# Patient Record
Sex: MALE | Race: WHITE | NOT HISPANIC OR LATINO
[De-identification: names, ages, dates, MRNs, and addresses within clinical notes are randomized per-mention and may not be internally consistent; named-entity substitution may affect disease eponyms.]

---

## 2022-09-01 ENCOUNTER — NON-APPOINTMENT (OUTPATIENT)
Age: 52
End: 2022-09-01

## 2023-06-01 ENCOUNTER — NON-APPOINTMENT (OUTPATIENT)
Age: 53
End: 2023-06-01

## 2023-06-07 ENCOUNTER — APPOINTMENT (OUTPATIENT)
Dept: GASTROENTEROLOGY | Facility: CLINIC | Age: 53
End: 2023-06-07
Payer: COMMERCIAL

## 2023-06-07 ENCOUNTER — NON-APPOINTMENT (OUTPATIENT)
Age: 53
End: 2023-06-07

## 2023-06-07 VITALS
HEIGHT: 73 IN | OXYGEN SATURATION: 98 % | RESPIRATION RATE: 19 BRPM | BODY MASS INDEX: 21.34 KG/M2 | SYSTOLIC BLOOD PRESSURE: 138 MMHG | DIASTOLIC BLOOD PRESSURE: 76 MMHG | HEART RATE: 54 BPM | WEIGHT: 161 LBS

## 2023-06-07 DIAGNOSIS — K31.84 GASTROPARESIS: ICD-10-CM

## 2023-06-07 PROCEDURE — 99204 OFFICE O/P NEW MOD 45 MIN: CPT

## 2023-06-07 NOTE — PHYSICAL EXAM
[Alert] : alert [Normal Voice/Communication] : normal voice/communication [Healthy Appearing] : healthy appearing [No Acute Distress] : no acute distress [Sclera] : the sclera and conjunctiva were normal [Hearing Threshold Finger Rub Not [place]] : hearing was normal [Normal Lips/Gums] : the lips and gums were normal [Oropharynx] : the oropharynx was normal [Normal Appearance] : the appearance of the neck was normal [No Neck Mass] : no neck mass was observed [No Respiratory Distress] : no respiratory distress [Normal PMI] : the apical impulse was abnormal [Bowel Sounds] : normal bowel sounds [Abdomen Tenderness] : non-tender [No Masses] : no abdominal mass palpated [Abdomen Soft] : soft [] : no hepatosplenomegaly [Oriented To Time, Place, And Person] : oriented to person, place, and time [Abnormal Walk] : normal gait [Normal Color / Pigmentation] : normal skin color and pigmentation [de-identified] : deferred  to colonoscopy

## 2023-06-07 NOTE — HISTORY OF PRESENT ILLNESS
[FreeTextEntry1] : 53 year M fam hx crc, chronic chest/RUQ pain, EoE, h/o chronic constipation, hemorrhoids, presents for evaluation of EoE and CRC screening\par He is seen at the request of Dr. Sepideh Bruce\par \par pat gf death from CRC at age 41\par started colonoscopy early\par \par Colonoscopy Dr. Husain 5/23/11- suboptimal prep, int hemorrhoids\par Colonoscopy. Dr. Husain 06/2011- fair prep, otherwise normal.\par Colonoscopy Dr. Thomas 10/2013 adequate prep, int hemorrhoids\par Colonoscopy Dr. Thomas 06/2017 fair- adequate prep, int hemorrhoids\par EGD Dr. Husain 10/2015- HH, irreg z line\par EGD Dr. Husain 06/2017- inc eos up to 91 per hpf, reactive gastropathy, normal duodenum\par \par long h/o chronic chest/RUQ  pain  for years\par \par EGD 06/2017- EoE\par saw allergist and changed his diet, cp improved. did not have EGD since them. \par \par stopped fried foods, no dairy, no gluten, no pasta sauce, no peanut, no shellfish\par he has h/o multiple allergies, allergy shots in his 20s\par \par chronic constipation he thinks it could be related to his cp. still gets cp periodically, ppt by large meals/ bending over. \par had rectal bleeding last year- \par scheduled for EGD/Colonoscopy- last year did not schedule. \par \par was on nexium a while ago\par prefers no medication\par \par takes psyllium daily- for past 9 months -now bm every 2 days- no rectal bleeding since last year\par has taken colace in the past\par no miralax or other constipation meds tried. declines regular MiraLAX\par \par ct many years ago- saw oncologist - ?lymphocytosis- normal. f/u ct a few years ago- reportedly unremarkable\par \par 180 lbs 10 years ago\par 160 lbs now, 165 lbs 1 year ago\par \par exercises regularly, 2-4 x per week- biking\par good appetite \par \par occ motrin \par \par 11/2022- reports labs normal. \par \par difficulty with colon prep- 5 day prep. \par \par meds: \par vitamins \par fish oil\par gelusil or Mylatna PRN\par Psyllium\par \par prior cardiac testing unremarkable. \par \par Patient denies n/v/heartburn, reflux, dysphagia/odynophagia, change in bowel habits, diarrhea,  melena. no weight loss.  Good appetite and energy level. Patient has daily BM, denies regular NSAID use. \par \par fam hx - pt gf -crc\par sister -colon polyps\par \par PMD: Dr. Sepideh Calvin

## 2023-06-07 NOTE — ASSESSMENT
[FreeTextEntry1] : CRC screening- \par due for colonoscopy \par h/o inadequate prep\par MiraLAX BID x 5 days, 2 dulcolax 2 days prior to procedure, 2 Dulcolax day prior to procedure, GoLYTELY, MiraLAX x 2 glasses am of procedure\par \par EoE- treated with elimination diet\par -EGD with bx to assess response\par \par \par RUQ/Chest pain, chronic, overall improved compared to prior. He reports prior negative cardiac eval. \par -check abd us, r/o biliary colic\par

## 2023-07-13 ENCOUNTER — RESULT REVIEW (OUTPATIENT)
Age: 53
End: 2023-07-13

## 2023-07-14 ENCOUNTER — APPOINTMENT (OUTPATIENT)
Dept: GASTROENTEROLOGY | Facility: HOSPITAL | Age: 53
End: 2023-07-14

## 2023-07-18 ENCOUNTER — NON-APPOINTMENT (OUTPATIENT)
Age: 53
End: 2023-07-18

## 2023-07-19 ENCOUNTER — OFFICE (OUTPATIENT)
Dept: URBAN - METROPOLITAN AREA CLINIC 29 | Facility: CLINIC | Age: 53
Setting detail: OPHTHALMOLOGY
End: 2023-07-19
Payer: COMMERCIAL

## 2023-07-19 ENCOUNTER — RX ONLY (RX ONLY)
Age: 53
End: 2023-07-19

## 2023-07-19 DIAGNOSIS — H53.002: ICD-10-CM

## 2023-07-19 DIAGNOSIS — H52.03: ICD-10-CM

## 2023-07-19 DIAGNOSIS — H52.4: ICD-10-CM

## 2023-07-19 DIAGNOSIS — H50.51: ICD-10-CM

## 2023-07-19 PROCEDURE — 92014 COMPRE OPH EXAM EST PT 1/>: CPT | Performed by: OPHTHALMOLOGY

## 2023-07-19 PROCEDURE — 92015 DETERMINE REFRACTIVE STATE: CPT | Performed by: OPHTHALMOLOGY

## 2023-07-19 ASSESSMENT — REFRACTION_CURRENTRX
OD_SPHERE: +1.00
OS_CYLINDER: +1.00
OD_AXIS: 090
OD_SPHERE: +1.50
OS_SPHERE: +0.75
OD_ADD: +2.00
OS_SPHERE: +1.00
OS_OVR_VA: 20/
OD_ADD: +1.75
OD_OVR_VA: 20/
OS_VPRISM_DIRECTION: PROGS
OD_OVR_VA: 20/
OS_CYLINDER: 0.00
OS_CYLINDER: SPHERE
OD_CYLINDER: 0.00
OS_OVR_VA: 20/
OD_VPRISM_DIRECTION: PROGS
OD_AXIS: 60
OD_SPHERE: +1.75
OD_CYLINDER: 0.00
OD_SPHERE: +1.00
OS_AXIS: 169
OS_SPHERE: +1.50
OD_VPRISM_DIRECTION: PROGS
OS_CYLINDER: SPHERE
OD_OVR_VA: 20/
OD_AXIS: 090
OS_SPHERE: +2.50
OS_OVR_VA: 20/
OD_AXIS: 70
OD_CYLINDER: +0.25
OS_ADD: +1.75
OS_ADD: +2.00
OS_AXIS: 090
OS_VPRISM_DIRECTION: PROGS
OD_CYLINDER: SPHERE

## 2023-07-19 ASSESSMENT — OVER_REFRACTION
OS_SPHERE: -0.75
OS_AXIS: 180
OD_VA1: 20/20-
OS_CYLINDER: +0.50
OS_ADD: +1.25
OD_ADD: +1.25
OD_SPHERE: +0.50
OD_CYLINDER: SPHERE

## 2023-07-19 ASSESSMENT — REFRACTION_AUTOREFRACTION
OD_AXIS: 070
OD_SPHERE: +0.50
OS_CYLINDER: +0.50
OD_CYLINDER: +0.50
OS_AXIS: 162
OS_SPHERE: -0.25

## 2023-07-19 ASSESSMENT — REFRACTION_MANIFEST
OD_CYLINDER: SPHERE
OD_VA1: 20/30
OD_SPHERE: +1.75
OS_VA1: 20/40
OS_SPHERE: +3.00
OD_ADD: +2.00
OS_ADD: +2.00
OS_SPHERE: +5.00
OS_CYLINDER: SPHERE
OS_AXIS: S
OS_CYLINDER: SPHERE
OD_SPHERE: +2.00
OD_SPHERE: +2.00
OS_VA1: 20/40
OD_CYLINDER: SPHERE
OU_VA: 20/30
OD_VA1: 20/25
OD_SPHERE: +3.00
OS_SPHERE: +3.00
OS_SPHERE: +2.50
OS_SPHERE: +4.25
OS_VA1: 20/30

## 2023-07-19 ASSESSMENT — SPHEQUIV_DERIVED
OS_SPHEQUIV: 0
OD_SPHEQUIV: 0.75

## 2023-07-19 ASSESSMENT — VISUAL ACUITY
OS_BCVA: 20/20
OD_BCVA: 20/70

## 2023-07-19 ASSESSMENT — CONFRONTATIONAL VISUAL FIELD TEST (CVF)
OD_FINDINGS: FULL
OS_FINDINGS: FULL

## 2023-07-19 ASSESSMENT — TONOMETRY
OS_IOP_MMHG: 15
OD_IOP_MMHG: 15

## 2023-07-25 ENCOUNTER — RESULT REVIEW (OUTPATIENT)
Age: 53
End: 2023-07-25

## 2023-09-20 ENCOUNTER — RESULT REVIEW (OUTPATIENT)
Age: 53
End: 2023-09-20

## 2023-10-25 ENCOUNTER — APPOINTMENT (OUTPATIENT)
Dept: GASTROENTEROLOGY | Facility: CLINIC | Age: 53
End: 2023-10-25
Payer: COMMERCIAL

## 2023-10-25 VITALS
OXYGEN SATURATION: 99 % | WEIGHT: 165 LBS | BODY MASS INDEX: 21.87 KG/M2 | SYSTOLIC BLOOD PRESSURE: 114 MMHG | HEIGHT: 73 IN | DIASTOLIC BLOOD PRESSURE: 70 MMHG | HEART RATE: 50 BPM

## 2023-10-25 DIAGNOSIS — K59.09 OTHER CONSTIPATION: ICD-10-CM

## 2023-10-25 DIAGNOSIS — K76.9 LIVER DISEASE, UNSPECIFIED: ICD-10-CM

## 2023-10-25 DIAGNOSIS — G89.29 RIGHT UPPER QUADRANT PAIN: ICD-10-CM

## 2023-10-25 DIAGNOSIS — Z12.11 ENCOUNTER FOR SCREENING FOR MALIGNANT NEOPLASM OF COLON: ICD-10-CM

## 2023-10-25 DIAGNOSIS — R10.11 RIGHT UPPER QUADRANT PAIN: ICD-10-CM

## 2023-10-25 PROCEDURE — 99214 OFFICE O/P EST MOD 30 MIN: CPT

## 2023-12-20 ENCOUNTER — APPOINTMENT (OUTPATIENT)
Age: 53
End: 2023-12-20
Payer: COMMERCIAL

## 2023-12-20 ENCOUNTER — NON-APPOINTMENT (OUTPATIENT)
Age: 53
End: 2023-12-20

## 2023-12-20 VITALS
HEIGHT: 73 IN | WEIGHT: 172 LBS | BODY MASS INDEX: 22.8 KG/M2 | SYSTOLIC BLOOD PRESSURE: 110 MMHG | DIASTOLIC BLOOD PRESSURE: 70 MMHG | OXYGEN SATURATION: 100 % | HEART RATE: 54 BPM

## 2023-12-20 DIAGNOSIS — E55.9 VITAMIN D DEFICIENCY, UNSPECIFIED: ICD-10-CM

## 2023-12-20 DIAGNOSIS — D18.03 HEMANGIOMA OF INTRA-ABDOMINAL STRUCTURES: ICD-10-CM

## 2023-12-20 DIAGNOSIS — Z12.5 ENCOUNTER FOR SCREENING FOR MALIGNANT NEOPLASM OF PROSTATE: ICD-10-CM

## 2023-12-20 DIAGNOSIS — Z00.00 ENCOUNTER FOR GENERAL ADULT MEDICAL EXAMINATION W/OUT ABNORMAL FINDINGS: ICD-10-CM

## 2023-12-20 DIAGNOSIS — K20.0 EOSINOPHILIC ESOPHAGITIS: ICD-10-CM

## 2023-12-20 DIAGNOSIS — Z11.59 ENCOUNTER FOR SCREENING FOR OTHER VIRAL DISEASES: ICD-10-CM

## 2023-12-20 DIAGNOSIS — R79.89 OTHER SPECIFIED ABNORMAL FINDINGS OF BLOOD CHEMISTRY: ICD-10-CM

## 2023-12-20 DIAGNOSIS — D72.9 DISORDER OF WHITE BLOOD CELLS, UNSPECIFIED: ICD-10-CM

## 2023-12-20 DIAGNOSIS — I86.1 SCROTAL VARICES: ICD-10-CM

## 2023-12-20 PROCEDURE — 99386 PREV VISIT NEW AGE 40-64: CPT | Mod: 25

## 2023-12-20 PROCEDURE — 36415 COLL VENOUS BLD VENIPUNCTURE: CPT

## 2023-12-20 RX ORDER — POLYETHYLENE GLYCOL 3350 AND ELECTROLYTES WITH LEMON FLAVOR 236; 22.74; 6.74; 5.86; 2.97 G/4L; G/4L; G/4L; G/4L; G/4L
236 POWDER, FOR SOLUTION ORAL
Qty: 1 | Refills: 0 | Status: DISCONTINUED | COMMUNITY
Start: 2023-07-10 | End: 2023-12-20

## 2023-12-21 LAB
25(OH)D3 SERPL-MCNC: 69.7 NG/ML
ALBUMIN SERPL ELPH-MCNC: 4.7 G/DL
ALP BLD-CCNC: 49 U/L
ALT SERPL-CCNC: 29 U/L
ANION GAP SERPL CALC-SCNC: 9 MMOL/L
AST SERPL-CCNC: 34 U/L
BASOPHILS # BLD AUTO: 0.04 K/UL
BASOPHILS NFR BLD AUTO: 0.8 %
BILIRUB SERPL-MCNC: 0.6 MG/DL
BUN SERPL-MCNC: 16 MG/DL
CALCIUM SERPL-MCNC: 9.5 MG/DL
CHLORIDE SERPL-SCNC: 102 MMOL/L
CHOLEST SERPL-MCNC: 151 MG/DL
CO2 SERPL-SCNC: 27 MMOL/L
CREAT SERPL-MCNC: 0.87 MG/DL
EGFR: 104 ML/MIN/1.73M2
EOSINOPHIL # BLD AUTO: 0.36 K/UL
EOSINOPHIL NFR BLD AUTO: 7.5 %
GLUCOSE SERPL-MCNC: 92 MG/DL
HCT VFR BLD CALC: 44.9 %
HCV AB SER QL: NONREACTIVE
HCV S/CO RATIO: 0.13 S/CO
HDLC SERPL-MCNC: 52 MG/DL
HGB BLD-MCNC: 15 G/DL
IMM GRANULOCYTES NFR BLD AUTO: 0.4 %
LDLC SERPL CALC-MCNC: 88 MG/DL
LYMPHOCYTES # BLD AUTO: 1.68 K/UL
LYMPHOCYTES NFR BLD AUTO: 34.8 %
MAN DIFF?: NORMAL
MCHC RBC-ENTMCNC: 30.9 PG
MCHC RBC-ENTMCNC: 33.4 GM/DL
MCV RBC AUTO: 92.6 FL
MONOCYTES # BLD AUTO: 0.42 K/UL
MONOCYTES NFR BLD AUTO: 8.7 %
NEUTROPHILS # BLD AUTO: 2.31 K/UL
NEUTROPHILS NFR BLD AUTO: 47.8 %
NONHDLC SERPL-MCNC: 99 MG/DL
PLATELET # BLD AUTO: 164 K/UL
POTASSIUM SERPL-SCNC: 4.3 MMOL/L
PROT SERPL-MCNC: 6.8 G/DL
PSA SERPL-MCNC: 0.2 NG/ML
RBC # BLD: 4.85 M/UL
RBC # FLD: 13.9 %
SODIUM SERPL-SCNC: 138 MMOL/L
TRIGL SERPL-MCNC: 50 MG/DL
TSH SERPL-ACNC: 1.68 UIU/ML
WBC # FLD AUTO: 4.83 K/UL

## 2023-12-26 NOTE — HISTORY OF PRESENT ILLNESS
[de-identified] : 53 yr old M eosinophilic esophagitis  allergy to chicken  stopped Nexium and changed diet significantly - walnuts and chicken lead to the acid reflux esophagitis issues   patient wishes to check vitamin d   did a bone marrow - Dr. Hebert checked everything fine - checking cbc   getting colonoscopy - paternal grandfather with a history of colon cancer at 40   tdap 2018

## 2023-12-26 NOTE — HEALTH RISK ASSESSMENT
[No] : No [0] : 2) Feeling down, depressed, or hopeless: Not at all (0) [PHQ-2 Negative - No further assessment needed] : PHQ-2 Negative - No further assessment needed [Patient reported colonoscopy was normal] : Patient reported colonoscopy was normal [HIV test declined] : HIV test declined [Hepatitis C test declined] : Hepatitis C test declined [With Family] : lives with family [Employed] : employed [] :  [# Of Children ___] : has [unfilled] children [Feels Safe at Home] : Feels safe at home [Never] : Never [de-identified] : started cycling 5 yrs ago  [de-identified] : monitoring diet - gluten and dairy free for a decade  [[id number]] : 0 [Change in mental status noted] : No change in mental status noted [ColonoscopyDate] : 7/14 [ColonoscopyComments] : 3 yrs ; polyps and FH

## 2023-12-26 NOTE — ASSESSMENT
[FreeTextEntry1] : patient with appropriate preventative UTD  no concerns on exam  age-appropriate counseling given.   psa asymptomatic no family history discussed and counseled regarding screening with patient and USPTF guidelines

## 2024-02-07 ENCOUNTER — RESULT REVIEW (OUTPATIENT)
Age: 54
End: 2024-02-07

## 2024-02-08 ENCOUNTER — APPOINTMENT (OUTPATIENT)
Dept: GASTROENTEROLOGY | Facility: HOSPITAL | Age: 54
End: 2024-02-08

## 2024-07-09 ENCOUNTER — RX ONLY (RX ONLY)
Age: 54
End: 2024-07-09

## 2024-07-09 ENCOUNTER — OFFICE (OUTPATIENT)
Dept: URBAN - METROPOLITAN AREA CLINIC 29 | Facility: CLINIC | Age: 54
Setting detail: OPHTHALMOLOGY
End: 2024-07-09
Payer: COMMERCIAL

## 2024-07-09 DIAGNOSIS — H53.002: ICD-10-CM

## 2024-07-09 DIAGNOSIS — H52.03: ICD-10-CM

## 2024-07-09 DIAGNOSIS — H50.51: ICD-10-CM

## 2024-07-09 PROCEDURE — 92014 COMPRE OPH EXAM EST PT 1/>: CPT | Performed by: OPHTHALMOLOGY

## 2024-07-09 ASSESSMENT — CONFRONTATIONAL VISUAL FIELD TEST (CVF)
OS_FINDINGS: FULL
OD_FINDINGS: FULL

## 2024-09-11 ENCOUNTER — APPOINTMENT (OUTPATIENT)
Dept: GASTROENTEROLOGY | Facility: CLINIC | Age: 54
End: 2024-09-11
Payer: COMMERCIAL

## 2024-09-11 VITALS
BODY MASS INDEX: 21.87 KG/M2 | WEIGHT: 165 LBS | DIASTOLIC BLOOD PRESSURE: 60 MMHG | OXYGEN SATURATION: 99 % | HEIGHT: 73 IN | SYSTOLIC BLOOD PRESSURE: 118 MMHG | HEART RATE: 53 BPM

## 2024-09-11 DIAGNOSIS — D18.03 HEMANGIOMA OF INTRA-ABDOMINAL STRUCTURES: ICD-10-CM

## 2024-09-11 DIAGNOSIS — Z12.11 ENCOUNTER FOR SCREENING FOR MALIGNANT NEOPLASM OF COLON: ICD-10-CM

## 2024-09-11 DIAGNOSIS — Z80.0 FAMILY HISTORY OF MALIGNANT NEOPLASM OF DIGESTIVE ORGANS: ICD-10-CM

## 2024-09-11 DIAGNOSIS — K59.09 OTHER CONSTIPATION: ICD-10-CM

## 2024-09-11 DIAGNOSIS — K20.0 EOSINOPHILIC ESOPHAGITIS: ICD-10-CM

## 2024-09-11 PROCEDURE — G2211 COMPLEX E/M VISIT ADD ON: CPT | Mod: NC

## 2024-09-11 PROCEDURE — 99214 OFFICE O/P EST MOD 30 MIN: CPT

## 2024-09-11 RX ORDER — MAGNESIUM OXIDE/MAG AA CHELATE 300 MG
CAPSULE ORAL
Refills: 0 | Status: ACTIVE | COMMUNITY

## 2024-09-11 RX ORDER — MULTIVIT-MIN/IRON/FOLIC ACID/K 18-600-40
CAPSULE ORAL
Refills: 0 | Status: ACTIVE | COMMUNITY

## 2024-09-11 RX ORDER — UBIDECARENONE/VIT E ACET 100MG-5
CAPSULE ORAL
Refills: 0 | Status: ACTIVE | COMMUNITY

## 2024-09-11 NOTE — ASSESSMENT
[FreeTextEntry1] : EoE--EGD 07/2023 c/w EoE, patient had allergy follow up.  recent allergy testing + for multiple foods- patient now avoiding these items-  -plan for repeat EGD showed improved eos -continue diet as recommended by allergist.    epigastric pain/chest pain  -chronic, intermittent, appears due to GERD as occurs with specific triggers and june after large meals.  cont antireflux diet/lifestyle patient does not wish to take medication or have further pH testing/surgical eval at this time.   Constipation- continue fiber, dulcolax or senna prn -patient does not like ingredients in MiraLAX   Benign hepatic hemangioma - seen on MRI in 09/2023  CRC screening- recall for colonoscopy 07/2028

## 2024-09-11 NOTE — ADDENDUM
[FreeTextEntry1] : 9/27/23- detailed message left for patient. benign hepatic hemangioma.  MRI results: IMPRESSION:  Small hepatic hemangioma corresponds to the indeterminate liver lesion noted on previous ultrasound.

## 2024-09-11 NOTE — PHYSICAL EXAM
[Alert] : alert [Normal Voice/Communication] : normal voice/communication [Healthy Appearing] : healthy appearing [No Acute Distress] : no acute distress [Sclera] : the sclera and conjunctiva were normal [Hearing Threshold Finger Rub Not [place]] : hearing was normal [Normal Lips/Gums] : the lips and gums were normal [Oropharynx] : the oropharynx was normal [Normal Appearance] : the appearance of the neck was normal [No Neck Mass] : no neck mass was observed [No Respiratory Distress] : no respiratory distress [Normal PMI] : the apical impulse was abnormal [Bowel Sounds] : normal bowel sounds [Abdomen Tenderness] : non-tender [No Masses] : no abdominal mass palpated [Abdomen Soft] : soft [] : no hepatosplenomegaly [Abnormal Walk] : normal gait [Normal Color / Pigmentation] : normal skin color and pigmentation [Oriented To Time, Place, And Person] : oriented to person, place, and time [de-identified] : deferred  to colonoscopy

## 2024-09-11 NOTE — PHYSICAL EXAM
[Alert] : alert [Normal Voice/Communication] : normal voice/communication [Healthy Appearing] : healthy appearing [No Acute Distress] : no acute distress [Sclera] : the sclera and conjunctiva were normal [Hearing Threshold Finger Rub Not [place]] : hearing was normal [Normal Lips/Gums] : the lips and gums were normal [Oropharynx] : the oropharynx was normal [Normal Appearance] : the appearance of the neck was normal [No Neck Mass] : no neck mass was observed [No Respiratory Distress] : no respiratory distress [Normal PMI] : the apical impulse was abnormal [Bowel Sounds] : normal bowel sounds [Abdomen Tenderness] : non-tender [No Masses] : no abdominal mass palpated [Abdomen Soft] : soft [] : no hepatosplenomegaly [Abnormal Walk] : normal gait [Normal Color / Pigmentation] : normal skin color and pigmentation [Oriented To Time, Place, And Person] : oriented to person, place, and time [de-identified] : deferred  to colonoscopy

## 2024-09-11 NOTE — HISTORY OF PRESENT ILLNESS
[FreeTextEntry1] : 54 year old M fam hx crc, chronic chest/RUQ pain, EoE, h/o chronic constipation, hemorrhoids, h/o allergies/eczema,  hepatic hemangioma presents for follow up of EoE  unable to eat large amounts for the past few years. gets reflux. does not feel his reflux is bad enough to warrant further medication or pH testing/surgical eval bm normal weight/appetite stable.  BM normal following up with allergist- found many foods that he is allergic to drinks 4 cups of coffee per day, can't sleep at night  EGD 02/2024 : FINAL DIAGNOSIS A. GE JUNCTION BIOPSY: - Squamocolumnar junctional mucosa with focal chronic inflammation involving gastric mucosa; negative for intestinal metaplasia - Esophageal squamous mucosa with increased intraepithelial eosinophils (10 to 25/hpf) B. MID ESOPHAGUS BIOPSY: - Esophageal squamous mucosa with focal intraepithelial eosinophils (0 to 10/hpf) C. PROXIMAL ESOPHAGUS BIOPSY: - Unremarkable esophageal squamous mucosa; no intraepithelial eosinophils seen 07/2023 EGD/Colonoscopy- esophagitis, c/w EoE, referred to Dr. Michael Cantu for allergy testing. Subcentimeter tubular adenoma and hyperplastic polyps, recall for colonoscopy in 5 years.   Abd US: 07/2023: IMPRESSION:  No evidence for acute cholecystitis.  Probable 1.7 cm hepatic hemangioma.  MRI 09/2023: IMPRESSION:  Small hepatic hemangioma corresponds to the indeterminate liver lesion noted on previous ultrasound     - see scanned records- allergy testing positive for multiple things he changed his diet on 9/14/23  intense epigastric/chest pain has been better- less frequent he had an episode recently after drinking water.  he had an episode severe last week , kept him up all night- not sure what triggered it.  no n/v  gets burning pointed pain when bending over, if eating the wrong food, if overeats, also if he doesnt have a bm.  he notes that fiber helps him move his bowels more regularly, he has recently been noncompliant with fiber   prior hx  pat gf death from CRC at age 41 started colonoscopy early  Colonoscopy Dr. Husain 5/23/11- suboptimal prep, int hemorrhoids Colonoscopy. Dr. Husain 06/2011- fair prep, otherwise normal. Colonoscopy Dr. Thomas 10/2013 adequate prep, int hemorrhoids Colonoscopy Dr. Thomas 06/2017 fair- adequate prep, int hemorrhoids EGD Dr. Husain 10/2015- HH, irreg z line EGD Dr. Husain 06/2017- inc eos up to 91 per hpf, reactive gastropathy, normal duodenum  long h/o chronic chest/RUQ  pain  for years  EGD 06/2017- EoE saw allergist and changed his diet, cp improved. did not have EGD since them.   stopped fried foods, no dairy, no gluten, no pasta sauce, no peanut, no shellfish he has h/o multiple allergies, allergy shots in his 20s  chronic constipation he thinks it could be related to his cp. still gets cp periodically, ppt by large meals/ bending over.  had rectal bleeding last year-  scheduled for EGD/Colonoscopy- last year did not schedule.   was on nexium a while ago prefers no medication  takes psyllium daily- for past 9 months -now bm every 2 days- no rectal bleeding since last year has taken colace in the past no miralax or other constipation meds tried. declines regular MiraLAX  ct many years ago- saw oncologist - ?lymphocytosis- normal. f/u ct a few years ago- reportedly unremarkable  180 lbs 10 years ago 160 lbs now, 165 lbs 1 year ago  exercises regularly, 2-4 x per week- biking good appetite   occ motrin   11/2022- reports labs normal.   difficulty with colon prep- 5 day prep.   meds:  vitamins  fish oil gelusil or [place] PRN Psyllium  prior cardiac testing unremarkable.   Patient denies n/v/heartburn, reflux, dysphagia/odynophagia, change in bowel habits, diarrhea,  melena. no weight loss.  Good appetite and energy level. Patient has daily BM, denies regular NSAID use.   fam hx - pt gf -crc sister -colon polyps  PMD: Dr. Sepideh Calvin

## 2025-03-03 ENCOUNTER — OFFICE (OUTPATIENT)
Facility: LOCATION | Age: 55
Setting detail: OPHTHALMOLOGY
End: 2025-03-03
Payer: COMMERCIAL

## 2025-03-03 DIAGNOSIS — H11.32: ICD-10-CM

## 2025-03-03 PROCEDURE — 92012 INTRM OPH EXAM EST PATIENT: CPT | Performed by: OPHTHALMOLOGY

## 2025-03-03 ASSESSMENT — REFRACTION_CURRENTRX
OS_SPHERE: +1.50
OS_SPHERE: +0.75
OD_SPHERE: +1.50
OS_SPHERE: +2.50
OD_AXIS: 90
OD_SPHERE: +1.75
OS_VPRISM_DIRECTION: PROGS
OD_ADD: +2.00
OS_CYLINDER: SPHERE
OS_AXIS: 090
OD_CYLINDER: +0.25
OS_SPHERE: +1.00
OD_AXIS: 70
OD_CYLINDER: SPH
OD_VPRISM_DIRECTION: PROGS
OS_AXIS: 168
OD_OVR_VA: 20/
OD_OVR_VA: 20/
OD_CYLINDER: 0.00
OS_CYLINDER: +1.00
OD_AXIS: 090
OS_OVR_VA: 20/
OD_CYLINDER: SPHERE
OD_ADD: +1.75
OS_CYLINDER: +1.00
OS_AXIS: 169
OD_AXIS: 60
OD_OVR_VA: 20/
OS_ADD: +2.00
OS_SPHERE: +0.75
OS_ADD: +1.75
OD_SPHERE: +1.00
OS_CYLINDER: SPHERE
OS_VPRISM_DIRECTION: PROGS
OS_OVR_VA: 20/
OS_CYLINDER: 0.00
OD_VPRISM_DIRECTION: PROGS
OD_AXIS: 090
OD_CYLINDER: 0.00
OD_SPHERE: +1.00
OS_OVR_VA: 20/
OD_SPHERE: +1.50

## 2025-03-03 ASSESSMENT — REFRACTION_MANIFEST
OD_SPHERE: +2.00
OS_SPHERE: +2.50
OD_SPHERE: +1.75
OD_CYLINDER: SPHERE
OD_CYLINDER: SPHERE
OS_SPHERE: +3.00
OS_CYLINDER: SPHERE
OS_CYLINDER: SPHERE
OS_SPHERE: +4.25
OS_AXIS: S
OD_ADD: +2.00
OS_ADD: +2.00
OD_SPHERE: +2.00
OS_VA1: 20/30
OU_VA: 20/30
OS_VA1: 20/40
OD_VA1: 20/25
OS_SPHERE: +3.00
OS_VA1: 20/40
OD_VA1: 20/30
OD_SPHERE: +3.00
OS_SPHERE: +5.00

## 2025-03-03 ASSESSMENT — OVER_REFRACTION
OD_VA1: 20/20-
OS_AXIS: 180
OD_ADD: +1.25
OS_CYLINDER: +0.50
OS_SPHERE: -0.75
OS_ADD: +1.25
OD_SPHERE: +0.50
OD_CYLINDER: SPHERE

## 2025-03-03 ASSESSMENT — REFRACTION_AUTOREFRACTION
OD_AXIS: 072
OD_SPHERE: +0.50
OD_CYLINDER: +0.25
OS_SPHERE: +0.50
OS_AXIS: 015
OS_CYLINDER: +0.25

## 2025-03-03 ASSESSMENT — TONOMETRY
OS_IOP_MMHG: 13
OD_IOP_MMHG: 12

## 2025-03-03 ASSESSMENT — VISUAL ACUITY
OD_BCVA: 20/30
OS_BCVA: 20/20-1

## 2025-03-03 ASSESSMENT — CONFRONTATIONAL VISUAL FIELD TEST (CVF)
OD_FINDINGS: FULL
OS_FINDINGS: FULL

## 2025-07-08 ENCOUNTER — OFFICE (OUTPATIENT)
Dept: URBAN - METROPOLITAN AREA CLINIC 29 | Facility: CLINIC | Age: 55
Setting detail: OPHTHALMOLOGY
End: 2025-07-08
Payer: COMMERCIAL

## 2025-07-08 DIAGNOSIS — H53.002: ICD-10-CM

## 2025-07-08 DIAGNOSIS — H35.013: ICD-10-CM

## 2025-07-08 DIAGNOSIS — H52.03: ICD-10-CM

## 2025-07-08 DIAGNOSIS — H50.51: ICD-10-CM

## 2025-07-08 PROCEDURE — 92014 COMPRE OPH EXAM EST PT 1/>: CPT | Performed by: OPHTHALMOLOGY

## 2025-07-08 ASSESSMENT — REFRACTION_AUTOREFRACTION
OD_CYLINDER: +0.50
OS_SPHERE: +0.50
OS_CYLINDER: +0.25
OD_AXIS: 076
OS_AXIS: 029
OD_SPHERE: +0.50

## 2025-07-08 ASSESSMENT — REFRACTION_CURRENTRX
OD_AXIS: 090
OD_ADD: +1.75
OD_SPHERE: +1.50
OS_CYLINDER: SPHERE
OS_ADD: +2.00
OD_AXIS: 090
OD_SPHERE: +1.50
OS_OVR_VA: 20/
OD_CYLINDER: 0.00
OD_ADD: +2.00
OS_AXIS: 169
OD_SPHERE: +1.00
OS_SPHERE: +0.75
OD_AXIS: 60
OD_CYLINDER: SPH
OD_AXIS: 90
OD_CYLINDER: SPHERE
OD_VPRISM_DIRECTION: PROGS
OD_OVR_VA: 20/
OS_CYLINDER: 0.00
OS_SPHERE: +2.50
OD_OVR_VA: 20/
OS_AXIS: 090
OS_OVR_VA: 20/
OD_CYLINDER: 0.00
OS_VPRISM_DIRECTION: PROGS
OS_CYLINDER: +1.00
OD_CYLINDER: +0.25
OS_SPHERE: +1.00
OS_SPHERE: +0.75
OS_CYLINDER: SPHERE
OS_VPRISM_DIRECTION: PROGS
OD_VPRISM_DIRECTION: PROGS
OS_CYLINDER: +1.00
OS_OVR_VA: 20/
OS_SPHERE: +1.50
OD_AXIS: 70
OD_SPHERE: +1.00
OD_SPHERE: +1.75
OS_ADD: +1.75
OS_AXIS: 168
OD_OVR_VA: 20/

## 2025-07-08 ASSESSMENT — REFRACTION_MANIFEST
OU_VA: 20/30
OS_SPHERE: +4.25
OD_SPHERE: +2.00
OS_SPHERE: +5.00
OS_VA1: 20/40
OD_SPHERE: +1.75
OS_SPHERE: +3.00
OS_VA1: 20/40
OS_SPHERE: +2.50
OS_SPHERE: +3.00
OS_CYLINDER: SPHERE
OS_CYLINDER: SPHERE
OD_CYLINDER: SPHERE
OS_AXIS: S
OD_VA1: 20/30
OD_VA1: 20/25
OD_CYLINDER: SPHERE
OS_ADD: +2.00
OD_SPHERE: +2.00
OS_VA1: 20/30
OD_ADD: +2.00
OD_SPHERE: +3.00

## 2025-07-08 ASSESSMENT — VISUAL ACUITY
OS_BCVA: 20/20
OD_BCVA: 20/30-1

## 2025-07-08 ASSESSMENT — OVER_REFRACTION
OD_ADD: +1.25
OS_CYLINDER: +0.50
OD_CYLINDER: SPHERE
OD_SPHERE: +0.50
OS_SPHERE: -0.75
OS_ADD: +1.25
OD_VA1: 20/20-
OS_AXIS: 180

## 2025-07-08 ASSESSMENT — CONFRONTATIONAL VISUAL FIELD TEST (CVF)
OS_FINDINGS: FULL
OD_FINDINGS: FULL